# Patient Record
Sex: MALE | Race: WHITE | NOT HISPANIC OR LATINO | Employment: UNEMPLOYED | URBAN - METROPOLITAN AREA
[De-identification: names, ages, dates, MRNs, and addresses within clinical notes are randomized per-mention and may not be internally consistent; named-entity substitution may affect disease eponyms.]

---

## 2018-02-10 ENCOUNTER — HOSPITAL ENCOUNTER (EMERGENCY)
Facility: HOSPITAL | Age: 48
Discharge: HOME | End: 2018-02-10
Attending: EMERGENCY MEDICINE
Payer: MEDICAID

## 2018-02-10 VITALS
RESPIRATION RATE: 17 BRPM | BODY MASS INDEX: 27.49 KG/M2 | SYSTOLIC BLOOD PRESSURE: 150 MMHG | TEMPERATURE: 98 F | HEART RATE: 74 BPM | WEIGHT: 185.63 LBS | HEIGHT: 69 IN | OXYGEN SATURATION: 98 % | DIASTOLIC BLOOD PRESSURE: 91 MMHG

## 2018-02-10 DIAGNOSIS — I10 CHRONIC HYPERTENSION: ICD-10-CM

## 2018-02-10 DIAGNOSIS — T75.4XXA ELECTRICAL SHOCK OF HAND, INITIAL ENCOUNTER: ICD-10-CM

## 2018-02-10 DIAGNOSIS — R07.9 CHEST PAIN: Primary | ICD-10-CM

## 2018-02-10 LAB
ALBUMIN SERPL BCP-MCNC: 3.8 G/DL
ALP SERPL-CCNC: 94 U/L
ALT SERPL W/O P-5'-P-CCNC: 63 U/L
ANION GAP SERPL CALC-SCNC: 10 MMOL/L
AST SERPL-CCNC: 35 U/L
BASOPHILS # BLD AUTO: 0.04 K/UL
BASOPHILS NFR BLD: 0.5 %
BILIRUB SERPL-MCNC: 0.4 MG/DL
BNP SERPL-MCNC: 11 PG/ML
BUN SERPL-MCNC: 19 MG/DL
CALCIUM SERPL-MCNC: 9.3 MG/DL
CHLORIDE SERPL-SCNC: 103 MMOL/L
CO2 SERPL-SCNC: 25 MMOL/L
CREAT SERPL-MCNC: 1 MG/DL
DIFFERENTIAL METHOD: NORMAL
EOSINOPHIL # BLD AUTO: 0.2 K/UL
EOSINOPHIL NFR BLD: 1.9 %
ERYTHROCYTE [DISTWIDTH] IN BLOOD BY AUTOMATED COUNT: 13.3 %
EST. GFR  (AFRICAN AMERICAN): >60 ML/MIN/1.73 M^2
EST. GFR  (NON AFRICAN AMERICAN): >60 ML/MIN/1.73 M^2
GLUCOSE SERPL-MCNC: 90 MG/DL
HCT VFR BLD AUTO: 43 %
HGB BLD-MCNC: 14.2 G/DL
LYMPHOCYTES # BLD AUTO: 2.4 K/UL
LYMPHOCYTES NFR BLD: 27.6 %
MCH RBC QN AUTO: 28.5 PG
MCHC RBC AUTO-ENTMCNC: 33 G/DL
MCV RBC AUTO: 86 FL
MONOCYTES # BLD AUTO: 0.8 K/UL
MONOCYTES NFR BLD: 9.3 %
NEUTROPHILS # BLD AUTO: 5.4 K/UL
NEUTROPHILS NFR BLD: 60.7 %
PLATELET # BLD AUTO: 178 K/UL
PMV BLD AUTO: 10.5 FL
POTASSIUM SERPL-SCNC: 3.6 MMOL/L
PROT SERPL-MCNC: 6.8 G/DL
RBC # BLD AUTO: 4.98 M/UL
SODIUM SERPL-SCNC: 138 MMOL/L
TROPONIN I SERPL DL<=0.01 NG/ML-MCNC: 0.01 NG/ML
WBC # BLD AUTO: 8.81 K/UL

## 2018-02-10 PROCEDURE — 85025 COMPLETE CBC W/AUTO DIFF WBC: CPT

## 2018-02-10 PROCEDURE — 93010 ELECTROCARDIOGRAM REPORT: CPT | Mod: ,,, | Performed by: INTERNAL MEDICINE

## 2018-02-10 PROCEDURE — 99284 EMERGENCY DEPT VISIT MOD MDM: CPT | Mod: 25

## 2018-02-10 PROCEDURE — 84484 ASSAY OF TROPONIN QUANT: CPT

## 2018-02-10 PROCEDURE — 80053 COMPREHEN METABOLIC PANEL: CPT

## 2018-02-10 PROCEDURE — 25000003 PHARM REV CODE 250: Performed by: EMERGENCY MEDICINE

## 2018-02-10 PROCEDURE — 83880 ASSAY OF NATRIURETIC PEPTIDE: CPT

## 2018-02-10 PROCEDURE — 93005 ELECTROCARDIOGRAM TRACING: CPT

## 2018-02-10 RX ORDER — NAPROXEN SODIUM 220 MG/1
324 TABLET, FILM COATED ORAL
Status: COMPLETED | OUTPATIENT
Start: 2018-02-10 | End: 2018-02-10

## 2018-02-10 RX ADMIN — ASPIRIN 81 MG 324 MG: 81 TABLET ORAL at 08:02

## 2018-02-11 NOTE — ED PROVIDER NOTES
"SCRIBE #1 NOTE: I, Suki Rankin, am scribing for, and in the presence of, Elia Mcdowell MD. I have scribed the entire note.      History      Chief Complaint   Patient presents with    Chest Pain     pt c/o chest "discomfort" after being shocked and having near syncopal episode x3 days ago       Review of patient's allergies indicates:  No Known Allergies     HPI   HPI    2/10/2018, 8:24 PM   History obtained from the patient      History of Present Illness: Ministerio Villar is a 47 y.o. male patient with a hx of HTN who presents to the Emergency Department for L-sided CP which onset after being shocked by changing a light fixture 3 days ago. Pt states that his R arm made contact with the light when he was shocked. Pt describes the pain as "uncomfortable" and "pinching". Symptoms are constant and moderate in severity. No mitigating or exacerbating factors reported. No other associated sxs reported. Patient denies any SOB, diaphoresis, palpitations, extremity weakness/numbness, leg pain/swelling, dizziness, cough, n/v, and all other sxs at this time. No further complaints or concerns at this time.         Arrival mode: Personal vehicle    PCP: Benjamin Tripathi MD       Past Medical History:  Past Medical History:   Diagnosis Date    Hypertension        Past Surgical History:  History reviewed. No pertinent surgical history.      Family History:  History reviewed. No pertinent family history.    Social History:  Social History     Social History Main Topics    Smoking status: Never Smoker    Smokeless tobacco: Never Used    Alcohol use No      Comment: rarely    Drug use: Yes      Comment: past drug use    Sexual activity: Unknown       ROS   Review of Systems   Constitutional: Negative for diaphoresis and fever.   HENT: Negative for sore throat.    Respiratory: Negative for cough and shortness of breath.    Cardiovascular: Positive for chest pain. Negative for palpitations and leg swelling. " "  Gastrointestinal: Negative for nausea and vomiting.   Genitourinary: Negative for dysuria.   Musculoskeletal: Negative for back pain.        (-) leg pain   Skin: Negative for rash.   Neurological: Negative for dizziness, weakness and numbness.   Hematological: Does not bruise/bleed easily.   All other systems reviewed and are negative.      Physical Exam      Initial Vitals [02/10/18 1839]   BP Pulse Resp Temp SpO2   (!) 174/115 75 18 98 °F (36.7 °C) 98 %      MAP       134.67          Physical Exam  Nursing Notes and Vital Signs Reviewed.  Constitutional: Patient is in no acute distress. Well-developed and well-nourished.  Head: Atraumatic. Normocephalic.  Eyes: PERRL. EOM intact. Conjunctivae are not pale. No scleral icterus.  ENT: Mucous membranes are moist. Oropharynx is clear and symmetric.    Neck: Supple. Full ROM. No lymphadenopathy.  Cardiovascular: Regular rate. Regular rhythm. No murmurs, rubs, or gallops. Distal pulses are 2+ and symmetric.  Pulmonary/Chest: No respiratory distress. Clear to auscultation bilaterally. No wheezing or rales.  Abdominal: Soft and non-distended.  There is no tenderness.  No rebound, guarding, or rigidity.   Musculoskeletal: Moves all extremities. No obvious deformities. No edema. No calf tenderness.  Skin: Warm and dry.  Neurological:  Alert, awake, and appropriate.  Normal speech.  No acute focal neurological deficits are appreciated.  Psychiatric: Normal affect. Good eye contact. Appropriate in content.    ED Course    Procedures  ED Vital Signs:  Vitals:    02/10/18 1839 02/10/18 2054 02/10/18 2202   BP: (!) 174/115 (!) 157/94 (!) 150/91   Pulse: 75 72 74   Resp: 18 18 17   Temp: 98 °F (36.7 °C)     TempSrc: Oral     SpO2: 98% 97% 98%   Weight: 84.2 kg (185 lb 10 oz)     Height: 5' 9" (1.753 m)         Abnormal Lab Results:  Labs Reviewed   COMPREHENSIVE METABOLIC PANEL - Abnormal; Notable for the following:        Result Value    ALT 63 (*)     All other components " within normal limits   B-TYPE NATRIURETIC PEPTIDE   CBC W/ AUTO DIFFERENTIAL   TROPONIN I        All Lab Results:  Results for orders placed or performed during the hospital encounter of 02/10/18   Brain natriuretic peptide   Result Value Ref Range    BNP 11 0 - 99 pg/mL   CBC auto differential   Result Value Ref Range    WBC 8.81 3.90 - 12.70 K/uL    RBC 4.98 4.60 - 6.20 M/uL    Hemoglobin 14.2 14.0 - 18.0 g/dL    Hematocrit 43.0 40.0 - 54.0 %    MCV 86 82 - 98 fL    MCH 28.5 27.0 - 31.0 pg    MCHC 33.0 32.0 - 36.0 g/dL    RDW 13.3 11.5 - 14.5 %    Platelets 178 150 - 350 K/uL    MPV 10.5 9.2 - 12.9 fL    Gran # (ANC) 5.4 1.8 - 7.7 K/uL    Lymph # 2.4 1.0 - 4.8 K/uL    Mono # 0.8 0.3 - 1.0 K/uL    Eos # 0.2 0.0 - 0.5 K/uL    Baso # 0.04 0.00 - 0.20 K/uL    Gran% 60.7 38.0 - 73.0 %    Lymph% 27.6 18.0 - 48.0 %    Mono% 9.3 4.0 - 15.0 %    Eosinophil% 1.9 0.0 - 8.0 %    Basophil% 0.5 0.0 - 1.9 %    Differential Method Automated    Comprehensive metabolic panel   Result Value Ref Range    Sodium 138 136 - 145 mmol/L    Potassium 3.6 3.5 - 5.1 mmol/L    Chloride 103 95 - 110 mmol/L    CO2 25 23 - 29 mmol/L    Glucose 90 70 - 110 mg/dL    BUN, Bld 19 6 - 20 mg/dL    Creatinine 1.0 0.5 - 1.4 mg/dL    Calcium 9.3 8.7 - 10.5 mg/dL    Total Protein 6.8 6.0 - 8.4 g/dL    Albumin 3.8 3.5 - 5.2 g/dL    Total Bilirubin 0.4 0.1 - 1.0 mg/dL    Alkaline Phosphatase 94 55 - 135 U/L    AST 35 10 - 40 U/L    ALT 63 (H) 10 - 44 U/L    Anion Gap 10 8 - 16 mmol/L    eGFR if African American >60 >60 mL/min/1.73 m^2    eGFR if non African American >60 >60 mL/min/1.73 m^2   Troponin I   Result Value Ref Range    Troponin I 0.007 0.000 - 0.026 ng/mL         Imaging Results:  Imaging Results          X-Ray Chest AP Portable (Final result)  Result time 02/10/18 21:01:00    Final result by Rodo Sanabria MD (02/10/18 21:01:00)                 Impression:         Negative portable chest x-ray.      Electronically signed by: RODO SANABRIA  MD  Date:     02/10/18  Time:    21:01              Narrative:    Portable chest x-ray. 02/10/18 20:58:27    Clinical indication:  chest pain     Heart size is normal. The lung fields are clear. No acute cardiopulmonary infiltrate.                             The EKG was ordered, reviewed, and independently interpreted by the ED provider.  Interpretation time: 1846  Rate: 76 BPM  Rhythm: normal sinus rhythm  Interpretation: Moderate voltage criteria for LVH. No STEMI.           The Emergency Provider reviewed the vital signs and test results, which are outlined above.    ED Discussion     10:15 PM: Reassessed pt at this time.  Pt is awake, alert, and in NAD. Pt remains comfortable at this time. Discussed with pt all pertinent ED information and results. Discussed pt dx and plan of tx. Gave pt all f/u and return to the ED instructions. All questions and concerns were addressed at this time. Pt expresses understanding of information and instructions, and is comfortable with plan to discharge. Pt is stable for discharge.    I discussed with patient and/or family/caretaker that evaluation in the ED does not suggest any emergent or life threatening medical conditions requiring immediate intervention beyond what was provided in the ED, and I believe patient is safe for discharge.  Regardless, an unremarkable evaluation in the ED does not preclude the development or presence of a serious of life threatening condition. As such, patient was instructed to return immediately for any worsening or change in current symptoms.      ED Medication(s):  Medications   aspirin chewable tablet 324 mg (324 mg Oral Given 2/10/18 2045)       Discharge Medication List as of 2/10/2018 10:19 PM          Follow-up Information     Benjamin Tripathi MD. Schedule an appointment as soon as possible for a visit on 2/14/2018.    Specialty:  Family Medicine  Contact information:  [address] [facility]   [address]  [facility] [address]  [phone]                      Medical Decision Making    Medical Decision Making:   Differential Diagnosis:   DO Score                           Age >/= 65   No  >/=3 Risk Factors  No       FH CAD    No       HTN    Yes        HLD    No       DM     No       Current smoker   No  Known CAD   No  ASA use in past 7days No  Severe angina   No  ST elevation   No  Elevated cardiac markers No    Clinical Tests:   Lab Tests: Ordered and Reviewed  Radiological Study: Ordered and Reviewed  Medical Tests: Ordered and Reviewed           Scribe Attestation:   Scribe #1: I performed the above scribed service and the documentation accurately describes the services I performed. I attest to the accuracy of the note.    Attending:   Physician Attestation Statement for Scribe #1: I, Elia Mcdowell MD, personally performed the services described in this documentation, as scribed by Suki Rankin, in my presence, and it is both accurate and complete.          Clinical Impression       ICD-10-CM ICD-9-CM   1. Chest pain R07.9 786.50   2. Chronic hypertension I10 401.9   3. Electrical shock of hand, initial encounter T75.4XXA 994.8     E925.9       Disposition:   Disposition: Discharged  Condition: Stable         Elia Mcdowell MD  02/11/18 0649